# Patient Record
Sex: FEMALE | Race: WHITE | NOT HISPANIC OR LATINO | URBAN - METROPOLITAN AREA
[De-identification: names, ages, dates, MRNs, and addresses within clinical notes are randomized per-mention and may not be internally consistent; named-entity substitution may affect disease eponyms.]

---

## 2022-06-20 ENCOUNTER — APPOINTMENT (RX ONLY)
Dept: URBAN - METROPOLITAN AREA CLINIC 12 | Facility: CLINIC | Age: 47
Setting detail: DERMATOLOGY
End: 2022-06-20

## 2022-06-20 DIAGNOSIS — L81.4 OTHER MELANIN HYPERPIGMENTATION: ICD-10-CM

## 2022-06-20 DIAGNOSIS — Z87.2 PERSONAL HISTORY OF DISEASES OF THE SKIN AND SUBCUTANEOUS TISSUE: ICD-10-CM

## 2022-06-20 DIAGNOSIS — Z71.89 OTHER SPECIFIED COUNSELING: ICD-10-CM

## 2022-06-20 DIAGNOSIS — Z80.8 FAMILY HISTORY OF MALIGNANT NEOPLASM OF OTHER ORGANS OR SYSTEMS: ICD-10-CM

## 2022-06-20 DIAGNOSIS — D22 MELANOCYTIC NEVI: ICD-10-CM

## 2022-06-20 DIAGNOSIS — L30.9 DERMATITIS, UNSPECIFIED: ICD-10-CM

## 2022-06-20 PROBLEM — D22.5 MELANOCYTIC NEVI OF TRUNK: Status: ACTIVE | Noted: 2022-06-20

## 2022-06-20 PROCEDURE — 99203 OFFICE O/P NEW LOW 30 MIN: CPT

## 2022-06-20 PROCEDURE — ? PATIENT EDUCATION

## 2022-06-20 PROCEDURE — ? COUNSELING

## 2022-06-20 ASSESSMENT — LOCATION ZONE DERM
LOCATION ZONE: LEG
LOCATION ZONE: TRUNK
LOCATION ZONE: ARM
LOCATION ZONE: FACE

## 2022-06-20 ASSESSMENT — LOCATION SIMPLE DESCRIPTION DERM
LOCATION SIMPLE: LEFT CHEEK
LOCATION SIMPLE: RIGHT SHOULDER
LOCATION SIMPLE: LEFT ANKLE
LOCATION SIMPLE: LEFT UPPER BACK

## 2022-06-20 ASSESSMENT — LOCATION DETAILED DESCRIPTION DERM
LOCATION DETAILED: LEFT CENTRAL MALAR CHEEK
LOCATION DETAILED: LEFT POSTERIOR ANKLE
LOCATION DETAILED: LEFT SUPERIOR MEDIAL UPPER BACK
LOCATION DETAILED: RIGHT POSTERIOR SHOULDER

## 2023-10-03 ENCOUNTER — APPOINTMENT (RX ONLY)
Dept: URBAN - METROPOLITAN AREA CLINIC 12 | Facility: CLINIC | Age: 48
Setting detail: DERMATOLOGY
End: 2023-10-03

## 2023-10-03 DIAGNOSIS — L72.8 OTHER FOLLICULAR CYSTS OF THE SKIN AND SUBCUTANEOUS TISSUE: ICD-10-CM

## 2023-10-03 PROCEDURE — ? COUNSELING

## 2023-10-03 PROCEDURE — 99212 OFFICE O/P EST SF 10 MIN: CPT

## 2023-10-03 PROCEDURE — ? DEFER

## 2023-10-03 ASSESSMENT — LOCATION SIMPLE DESCRIPTION DERM: LOCATION SIMPLE: ABDOMEN

## 2023-10-03 ASSESSMENT — LOCATION DETAILED DESCRIPTION DERM: LOCATION DETAILED: EPIGASTRIC SKIN

## 2023-10-03 ASSESSMENT — LOCATION ZONE DERM: LOCATION ZONE: TRUNK

## 2023-10-03 NOTE — HPI: SKIN LESION
Is This A New Presentation, Or A Follow-Up?: Skin Lesion
What Type Of Note Output Would You Prefer (Optional)?: Standard Output
How Severe Is Your Skin Lesion?: mild
Has Your Skin Lesion Been Treated?: not been treated
Which Family Member (Optional)?: Father, paternal aunt, maternal grandmother

## 2023-10-03 NOTE — PROCEDURE: DEFER
X Size Of Lesion In Cm (Optional): 0
Detail Level: Detailed
Introduction Text (Please End With A Colon): The following procedure was deferred:
Instructions (Optional): Recommended patient visit PCP to evaluate subcutaneous mass. If no changes to lesion and removal is desired, patient may schedule for excision in 2 months.

## 2024-03-01 ENCOUNTER — APPOINTMENT (RX ONLY)
Dept: URBAN - METROPOLITAN AREA CLINIC 12 | Facility: CLINIC | Age: 49
Setting detail: DERMATOLOGY
End: 2024-03-01

## 2024-03-01 DIAGNOSIS — L72.8 OTHER FOLLICULAR CYSTS OF THE SKIN AND SUBCUTANEOUS TISSUE: ICD-10-CM

## 2024-03-01 PROBLEM — D48.5 NEOPLASM OF UNCERTAIN BEHAVIOR OF SKIN: Status: ACTIVE | Noted: 2024-03-01

## 2024-03-01 PROCEDURE — 11401 EXC TR-EXT B9+MARG 0.6-1 CM: CPT

## 2024-03-01 PROCEDURE — 12032 INTMD RPR S/A/T/EXT 2.6-7.5: CPT

## 2024-03-01 PROCEDURE — ? EXCISION

## 2024-03-01 PROCEDURE — ? COUNSELING

## 2024-03-01 ASSESSMENT — LOCATION SIMPLE DESCRIPTION DERM: LOCATION SIMPLE: ABDOMEN

## 2024-03-01 ASSESSMENT — LOCATION ZONE DERM: LOCATION ZONE: TRUNK

## 2024-03-01 ASSESSMENT — LOCATION DETAILED DESCRIPTION DERM: LOCATION DETAILED: EPIGASTRIC SKIN

## 2024-03-01 NOTE — PROCEDURE: EXCISION

## 2024-03-12 ENCOUNTER — APPOINTMENT (RX ONLY)
Dept: URBAN - METROPOLITAN AREA CLINIC 12 | Facility: CLINIC | Age: 49
Setting detail: DERMATOLOGY
End: 2024-03-12

## 2024-03-12 DIAGNOSIS — Z48.02 ENCOUNTER FOR REMOVAL OF SUTURES: ICD-10-CM

## 2024-03-12 PROCEDURE — ? SUTURE REMOVAL (GLOBAL PERIOD)

## 2024-03-12 PROCEDURE — 99024 POSTOP FOLLOW-UP VISIT: CPT

## 2024-03-12 ASSESSMENT — LOCATION ZONE DERM: LOCATION ZONE: TRUNK

## 2024-03-12 ASSESSMENT — LOCATION SIMPLE DESCRIPTION DERM: LOCATION SIMPLE: ABDOMEN

## 2024-03-12 ASSESSMENT — LOCATION DETAILED DESCRIPTION DERM: LOCATION DETAILED: RIGHT RIB CAGE

## 2024-03-12 ASSESSMENT — PAIN INTENSITY VAS: HOW INTENSE IS YOUR PAIN 0 BEING NO PAIN, 10 BEING THE MOST SEVERE PAIN POSSIBLE?: NO PAIN

## 2024-03-12 NOTE — PROCEDURE: SUTURE REMOVAL (GLOBAL PERIOD)
Detail Level: Detailed
Add 33506 Cpt? (Important Note: In 2017 The Use Of 03124 Is Being Tracked By Cms To Determine Future Global Period Reimbursement For Global Periods): yes